# Patient Record
Sex: MALE | Race: WHITE | HISPANIC OR LATINO | Employment: FULL TIME | URBAN - METROPOLITAN AREA
[De-identification: names, ages, dates, MRNs, and addresses within clinical notes are randomized per-mention and may not be internally consistent; named-entity substitution may affect disease eponyms.]

---

## 2018-01-02 ENCOUNTER — OFFICE VISIT (OUTPATIENT)
Dept: URGENT CARE | Facility: PHYSICIAN GROUP | Age: 23
End: 2018-01-02
Payer: COMMERCIAL

## 2018-01-02 VITALS
DIASTOLIC BLOOD PRESSURE: 80 MMHG | WEIGHT: 126 LBS | OXYGEN SATURATION: 96 % | RESPIRATION RATE: 16 BRPM | HEIGHT: 69 IN | HEART RATE: 102 BPM | BODY MASS INDEX: 18.66 KG/M2 | TEMPERATURE: 100.5 F | SYSTOLIC BLOOD PRESSURE: 108 MMHG

## 2018-01-02 DIAGNOSIS — J02.9 SORE THROAT: ICD-10-CM

## 2018-01-02 DIAGNOSIS — J06.9 URI, ACUTE: Primary | ICD-10-CM

## 2018-01-02 DIAGNOSIS — R50.9 FEVER, UNSPECIFIED FEVER CAUSE: ICD-10-CM

## 2018-01-02 LAB
INT CON NEG: NORMAL
INT CON POS: NORMAL
S PYO AG THROAT QL: NEGATIVE

## 2018-01-02 PROCEDURE — 99214 OFFICE O/P EST MOD 30 MIN: CPT | Performed by: PHYSICIAN ASSISTANT

## 2018-01-02 PROCEDURE — 87880 STREP A ASSAY W/OPTIC: CPT | Performed by: PHYSICIAN ASSISTANT

## 2018-01-02 ASSESSMENT — ENCOUNTER SYMPTOMS
HEADACHES: 1
SORE THROAT: 1
MYALGIAS: 1
CHILLS: 1
FEVER: 1
SWEATS: 1
RHINORRHEA: 1
WHEEZING: 0
SPUTUM PRODUCTION: 1
COUGH: 1

## 2018-01-02 NOTE — PATIENT INSTRUCTIONS
"Influenza Facts  Flu (influenza) is a contagious respiratory illness caused by the influenza viruses. It can cause mild to severe illness. While most healthy people recover from the flu without specific treatment and without complications, older people, young children, and people with certain health conditions are at higher risk for serious complications from the flu, including death.  CAUSES   · The flu virus is spread from person to person by respiratory droplets from coughing and sneezing.   · A person can also become infected by touching an object or surface with a virus on it and then touching their mouth, eye or nose.   · Adults may be able to infect others from 1 day before symptoms occur and up to 7 days after getting sick. So it is possible to give someone the flu even before you know you are sick and continue to infect others while you are sick.   SYMPTOMS   · Fever (usually high).   · Headache.   · Tiredness (can be extreme).   · Cough.   · Sore throat.   · Runny or stuffy nose.   · Body aches.   · Diarrhea and vomiting may also occur, particularly in children.   · These symptoms are referred to as \"flu-like symptoms\". A lot of different illnesses, including the common cold, can have similar symptoms.   DIAGNOSIS   · There are tests that can determine if you have the flu as long you are tested within the first 2 or 3 days of illness.   · A doctor's exam and additional tests may be needed to identify if you have a disease that is a complicating the flu.   RISKS AND COMPLICATIONS   Some of the complications caused by the flu include:  · Bacterial pneumonia or progressive pneumonia caused by the flu virus.   · Loss of body fluids (dehydration).   · Worsening of chronic medical conditions, such as heart failure, asthma, or diabetes.   · Sinus problems and ear infections.   HOME CARE INSTRUCTIONS   · Seek medical care early on.   · If you are at high risk from complications of the flu, consult your health-care " provider as soon as you develop flu-like symptoms. Those at high risk for complications include:   · People 65 years or older.   · People with chronic medical conditions, including diabetes.   · Pregnant women.   · Young children.   · Your caregiver may recommend use of an antiviral medication to help treat the flu.   · If you get the flu, get plenty of rest, drink a lot of liquids, and avoid using alcohol and tobacco.   · You can take over-the-counter medications to relieve the symptoms of the flu if your caregiver approves. (Never give aspirin to children or teenagers who have flu-like symptoms, particularly fever).   PREVENTION   The single best way to prevent the flu is to get a flu vaccine each fall. Other measures that can help protect against the flu are:  · Antiviral Medications   · A number of antiviral drugs are approved for use in preventing the flu. These are prescription medications, and a doctor should be consulted before they are used.   · Habits for Good Health   · Cover your nose and mouth with a tissue when you cough or sneeze, throw the tissue away after you use it.   · Wash your hands often with soap and water, especially after you cough or sneeze. If you are not near water, use an alcohol-based hand .   · Avoid people who are sick.   · If you get the flu, stay home from work or school. Avoid contact with other people so that you do not make them sick, too.   · Try not to touch your eyes, nose, or mouth as germs ore often spread this way.   IN CHILDREN, EMERGENCY WARNING SIGNS THAT NEED URGENT MEDICAL ATTENTION:  · Fast breathing or trouble breathing.   · Bluish skin color.   · Not drinking enough fluids.   · Not waking up or not interacting.   · Being so irritable that the child does not want to be held.   · Flu-like symptoms improve but then return with fever and worse cough.   · Fever with a rash.   IN ADULTS, EMERGENCY WARNING SIGNS THAT NEED URGENT MEDICAL ATTENTION:  · Difficulty  breathing or shortness of breath.   · Pain or pressure in the chest or abdomen.   · Sudden dizziness.   · Confusion.   · Severe or persistent vomiting.   SEEK IMMEDIATE MEDICAL CARE IF:   You or someone you know is experiencing any of the symptoms above. When you arrive at the emergency center,report that you think you have the flu. You may be asked to wear a mask and/or sit in a secluded area to protect others from getting sick.  MAKE SURE YOU:   · Understand these instructions.   · Monitor your condition.   · Seek medical care if you are getting worse, or not improving.   Document Released: 12/20/2004 Document Revised: 03/11/2013 Document Reviewed: 09/16/2010  [employer]® Patient Information ©2013 [employer], [employer].

## 2018-01-02 NOTE — PROGRESS NOTES
Subjective:      Moses Bland is a 22 y.o. male who presents with Cough (chills, congestion, sore throat x 5 days)    PMH: Reviewed with patient/family member/EPIC.   MEDS:   Current Outpatient Prescriptions:   •  cephALEXin (KEFLEX) 500 MG Cap, Take 1 Cap by mouth 2 times a day., Disp: 10 Cap, Rfl: 0  ALLERGIES: No Known Allergies  SURGHX:   Past Surgical History:   Procedure Laterality Date   • CLOSED REDUCTION  10/9/2009    Performed by GARY CORONADO at [facility]     SOCHX:  reports that he has never smoked. He does not have any smokeless tobacco history on file. He reports that he does not drink alcohol or use drugs.  FH: Reviewed with patient/family. Not pertinent to this complaint.          Patient presents to the clinic today with complaint of fever, chills, bodyaches, sore throat ×6 days. Patient states he has been taking a number of over-the-counter cough and cold medications without improvement. Patient has positive strep and positive flu exposure at work.     Patient has missed work today and would like a work note.      Cough   This is a new problem. The current episode started in the past 7 days. The problem has been unchanged. The problem occurs every few minutes. The cough is productive of sputum. Associated symptoms include chills, a fever, headaches, myalgias, nasal congestion, postnasal drip, rhinorrhea, a sore throat and sweats. Pertinent negatives include no wheezing. The symptoms are aggravated by lying down (exertion, cold air, ). He has tried body position changes, OTC cough suppressant and rest (motrin) for the symptoms. The treatment provided mild relief. There is no history of asthma or pneumonia.       Review of Systems   Constitutional: Positive for chills, fever and malaise/fatigue.   HENT: Positive for congestion, postnasal drip, rhinorrhea and sore throat.    Respiratory: Positive for cough and sputum production. Negative for wheezing.    Musculoskeletal:  "Positive for myalgias.   Neurological: Positive for headaches.          Objective:     /80   Pulse (!) 102   Temp (!) 38.1 °C (100.5 °F)   Resp 16   Ht 1.753 m (5' 9\")   Wt 57.2 kg (126 lb)   SpO2 96%   BMI 18.61 kg/m²      Physical Exam   Constitutional: He is oriented to person, place, and time. He appears well-developed and well-nourished. No distress.   HENT:   Head: Normocephalic and atraumatic.   Right Ear: Tympanic membrane normal.   Left Ear: Tympanic membrane normal.   Nose: Mucosal edema and rhinorrhea present.   Mouth/Throat: Uvula is midline. Posterior oropharyngeal erythema present. No posterior oropharyngeal edema. No tonsillar exudate.   Eyes: Conjunctivae and EOM are normal. Pupils are equal, round, and reactive to light.   Neck: Normal range of motion. Neck supple.   Cardiovascular: Normal rate, regular rhythm and normal heart sounds.    Pulmonary/Chest: Effort normal. He has no decreased breath sounds. He has rhonchi.   Abdominal: Soft.   Musculoskeletal: Normal range of motion.   Lymphadenopathy:     He has no cervical adenopathy.   Neurological: He is alert and oriented to person, place, and time. Gait normal.   Skin: Skin is warm and dry. Capillary refill takes less than 2 seconds.   Psychiatric: He has a normal mood and affect.   Nursing note and vitals reviewed.         Strep: neg     Assessment/Plan:     1. URI, acute     2. Sore throat  POCT Rapid Strep A   3. Fever, unspecified fever cause  POCT Rapid Strep A     PT likely has the flu, however, pt presentation this clinic is out of the window for antiviral treatment of symptoms.  This was discussed with patient (family), who verbalized understanding of discussion.    PT can continue OTC medications, increase fluids and rest until symptoms improve.     PT should follow up with PCP in 1-2 days for re-evaluation if symptoms have not improved.  Discussed red flags and reasons to return to UC or ED.  Pt and/or family verbalized " understanding of diagnosis and follow up instructions and was offered informational handout on diagnosis.  PT discharged.

## 2018-01-02 NOTE — LETTER
January 2, 2018         Patient: Moses Bland   YOB: 1995   Date of Visit: 1/2/2018           To Whom it May Concern:    Moses Bland was seen in my clinic on 1/2/2018. He may return to work on 01/05/2017.    If you have any questions or concerns, please don't hesitate to call.        Sincerely,           Miri Ge P.A.-C.  Electronically Signed

## 2023-06-19 ENCOUNTER — OFFICE VISIT (OUTPATIENT)
Dept: MEDICAL GROUP | Facility: MEDICAL CENTER | Age: 28
End: 2023-06-19
Payer: COMMERCIAL

## 2023-06-19 VITALS
HEIGHT: 70 IN | DIASTOLIC BLOOD PRESSURE: 70 MMHG | WEIGHT: 125 LBS | OXYGEN SATURATION: 97 % | BODY MASS INDEX: 17.9 KG/M2 | TEMPERATURE: 98.5 F | HEART RATE: 77 BPM | SYSTOLIC BLOOD PRESSURE: 124 MMHG

## 2023-06-19 DIAGNOSIS — Z13.6 SCREENING FOR CARDIOVASCULAR CONDITION: ICD-10-CM

## 2023-06-19 DIAGNOSIS — Z11.3 SCREENING EXAMINATION FOR SEXUALLY TRANSMITTED DISEASE: ICD-10-CM

## 2023-06-19 DIAGNOSIS — R10.11 RUQ PAIN: ICD-10-CM

## 2023-06-19 DIAGNOSIS — L20.82 FLEXURAL ECZEMA: ICD-10-CM

## 2023-06-19 DIAGNOSIS — Z23 NEED FOR VACCINATION: ICD-10-CM

## 2023-06-19 DIAGNOSIS — Z11.59 NEED FOR HEPATITIS C SCREENING TEST: ICD-10-CM

## 2023-06-19 PROCEDURE — 90715 TDAP VACCINE 7 YRS/> IM: CPT | Performed by: FAMILY MEDICINE

## 2023-06-19 PROCEDURE — 90471 IMMUNIZATION ADMIN: CPT | Performed by: FAMILY MEDICINE

## 2023-06-19 PROCEDURE — 3078F DIAST BP <80 MM HG: CPT | Performed by: FAMILY MEDICINE

## 2023-06-19 PROCEDURE — 99204 OFFICE O/P NEW MOD 45 MIN: CPT | Mod: 25 | Performed by: FAMILY MEDICINE

## 2023-06-19 PROCEDURE — 3074F SYST BP LT 130 MM HG: CPT | Performed by: FAMILY MEDICINE

## 2023-06-19 RX ORDER — IBUPROFEN 600 MG/1
TABLET ORAL
COMMUNITY
Start: 2023-03-27

## 2023-06-19 RX ORDER — TRIAMCINOLONE ACETONIDE 1 MG/G
1 CREAM TOPICAL 2 TIMES DAILY PRN
Qty: 45 G | Refills: 0 | Status: SHIPPED | OUTPATIENT
Start: 2023-06-19

## 2023-06-19 SDOH — ECONOMIC STABILITY: TRANSPORTATION INSECURITY
IN THE PAST 12 MONTHS, HAS LACK OF RELIABLE TRANSPORTATION KEPT YOU FROM MEDICAL APPOINTMENTS, MEETINGS, WORK OR FROM GETTING THINGS NEEDED FOR DAILY LIVING?: NO

## 2023-06-19 SDOH — ECONOMIC STABILITY: HOUSING INSECURITY
IN THE LAST 12 MONTHS, WAS THERE A TIME WHEN YOU DID NOT HAVE A STEADY PLACE TO SLEEP OR SLEPT IN A SHELTER (INCLUDING NOW)?: NO

## 2023-06-19 SDOH — ECONOMIC STABILITY: INCOME INSECURITY: IN THE LAST 12 MONTHS, WAS THERE A TIME WHEN YOU WERE NOT ABLE TO PAY THE MORTGAGE OR RENT ON TIME?: YES

## 2023-06-19 SDOH — ECONOMIC STABILITY: HOUSING INSECURITY
IN THE LAST 12 MONTHS, WAS THERE A TIME WHEN YOU DID NOT HAVE A STEADY PLACE TO SLEEP OR SLEPT IN A SHELTER (INCLUDING NOW)?: YES

## 2023-06-19 SDOH — ECONOMIC STABILITY: HOUSING INSECURITY: IN THE LAST 12 MONTHS, HOW MANY PLACES HAVE YOU LIVED?: 1

## 2023-06-19 SDOH — ECONOMIC STABILITY: FOOD INSECURITY: WITHIN THE PAST 12 MONTHS, YOU WORRIED THAT YOUR FOOD WOULD RUN OUT BEFORE YOU GOT MONEY TO BUY MORE.: NEVER TRUE

## 2023-06-19 SDOH — ECONOMIC STABILITY: INCOME INSECURITY: HOW HARD IS IT FOR YOU TO PAY FOR THE VERY BASICS LIKE FOOD, HOUSING, MEDICAL CARE, AND HEATING?: HARD

## 2023-06-19 SDOH — ECONOMIC STABILITY: TRANSPORTATION INSECURITY
IN THE PAST 12 MONTHS, HAS THE LACK OF TRANSPORTATION KEPT YOU FROM MEDICAL APPOINTMENTS OR FROM GETTING MEDICATIONS?: NO

## 2023-06-19 SDOH — HEALTH STABILITY: PHYSICAL HEALTH: ON AVERAGE, HOW MANY DAYS PER WEEK DO YOU ENGAGE IN MODERATE TO STRENUOUS EXERCISE (LIKE A BRISK WALK)?: 4 DAYS

## 2023-06-19 SDOH — ECONOMIC STABILITY: FOOD INSECURITY: WITHIN THE PAST 12 MONTHS, THE FOOD YOU BOUGHT JUST DIDN'T LAST AND YOU DIDN'T HAVE MONEY TO GET MORE.: NEVER TRUE

## 2023-06-19 SDOH — HEALTH STABILITY: MENTAL HEALTH
STRESS IS WHEN SOMEONE FEELS TENSE, NERVOUS, ANXIOUS, OR CAN'T SLEEP AT NIGHT BECAUSE THEIR MIND IS TROUBLED. HOW STRESSED ARE YOU?: TO SOME EXTENT

## 2023-06-19 SDOH — HEALTH STABILITY: PHYSICAL HEALTH: ON AVERAGE, HOW MANY MINUTES DO YOU ENGAGE IN EXERCISE AT THIS LEVEL?: 150+ MIN

## 2023-06-19 SDOH — ECONOMIC STABILITY: TRANSPORTATION INSECURITY
IN THE PAST 12 MONTHS, HAS LACK OF TRANSPORTATION KEPT YOU FROM MEETINGS, WORK, OR FROM GETTING THINGS NEEDED FOR DAILY LIVING?: NO

## 2023-06-19 ASSESSMENT — SOCIAL DETERMINANTS OF HEALTH (SDOH)
IN A TYPICAL WEEK, HOW MANY TIMES DO YOU TALK ON THE PHONE WITH FAMILY, FRIENDS, OR NEIGHBORS?: MORE THAN THREE TIMES A WEEK
HOW OFTEN DO YOU GET TOGETHER WITH FRIENDS OR RELATIVES?: MORE THAN THREE TIMES A WEEK
IN A TYPICAL WEEK, HOW MANY TIMES DO YOU TALK ON THE PHONE WITH FAMILY, FRIENDS, OR NEIGHBORS?: MORE THAN THREE TIMES A WEEK
ARE YOU MARRIED, WIDOWED, DIVORCED, SEPARATED, NEVER MARRIED, OR LIVING WITH A PARTNER?: NEVER MARRIED
HOW OFTEN DO YOU GET TOGETHER WITH FRIENDS OR RELATIVES?: MORE THAN THREE TIMES A WEEK
HOW OFTEN DO YOU HAVE SIX OR MORE DRINKS ON ONE OCCASION: NEVER
HOW HARD IS IT FOR YOU TO PAY FOR THE VERY BASICS LIKE FOOD, HOUSING, MEDICAL CARE, AND HEATING?: HARD
HOW OFTEN DO YOU ATTENT MEETINGS OF THE CLUB OR ORGANIZATION YOU BELONG TO?: NEVER
HOW OFTEN DO YOU ATTEND CHURCH OR RELIGIOUS SERVICES?: NEVER
WITHIN THE PAST 12 MONTHS, YOU WORRIED THAT YOUR FOOD WOULD RUN OUT BEFORE YOU GOT THE MONEY TO BUY MORE: NEVER TRUE
HOW OFTEN DO YOU ATTEND CHURCH OR RELIGIOUS SERVICES?: NEVER
HOW MANY DRINKS CONTAINING ALCOHOL DO YOU HAVE ON A TYPICAL DAY WHEN YOU ARE DRINKING: 1 OR 2
DO YOU BELONG TO ANY CLUBS OR ORGANIZATIONS SUCH AS CHURCH GROUPS UNIONS, FRATERNAL OR ATHLETIC GROUPS, OR SCHOOL GROUPS?: NO
DO YOU BELONG TO ANY CLUBS OR ORGANIZATIONS SUCH AS CHURCH GROUPS UNIONS, FRATERNAL OR ATHLETIC GROUPS, OR SCHOOL GROUPS?: NO
HOW OFTEN DO YOU ATTENT MEETINGS OF THE CLUB OR ORGANIZATION YOU BELONG TO?: NEVER
HOW OFTEN DO YOU HAVE A DRINK CONTAINING ALCOHOL: MONTHLY OR LESS
ARE YOU MARRIED, WIDOWED, DIVORCED, SEPARATED, NEVER MARRIED, OR LIVING WITH A PARTNER?: NEVER MARRIED

## 2023-06-19 ASSESSMENT — LIFESTYLE VARIABLES
SKIP TO QUESTIONS 9-10: 1
HOW OFTEN DO YOU HAVE A DRINK CONTAINING ALCOHOL: MONTHLY OR LESS
HOW OFTEN DO YOU HAVE SIX OR MORE DRINKS ON ONE OCCASION: NEVER
AUDIT-C TOTAL SCORE: 1
HOW MANY STANDARD DRINKS CONTAINING ALCOHOL DO YOU HAVE ON A TYPICAL DAY: 1 OR 2

## 2023-06-19 ASSESSMENT — PATIENT HEALTH QUESTIONNAIRE - PHQ9: CLINICAL INTERPRETATION OF PHQ2 SCORE: 0

## 2023-06-19 NOTE — PROGRESS NOTES
"Subjective:     CC:  Diagnoses of RUQ pain, Flexural eczema, Screening examination for sexually transmitted disease, Need for hepatitis C screening test, Need for vaccination, and Screening for cardiovascular condition were pertinent to this visit.    HISTORY OF THE PRESENT ILLNESS: Patient is a 28 y.o. male. This pleasant patient is here today to establish care and discuss RUQ Pain.     RUQ pain  Under lower right rib and radiates to back  Pain has been intermittent, thought it was dietary, started a year ago  Had improved  However over past week has been having more frequent pain  Back hurts  Getting stabbing, burning pain  Seems worse in the morning after breakfast  Last year has had more pellet like stools  Having more belching    Eczema  Has had eczema for a while  Typically gets it in flexural area of arms but has spread  His mother also gets eczema  He has tried hydrocortisone cream, can help    Acne  On his back  Has been ongoing    Problem   Ruq Pain   Flexural Eczema       Current Outpatient Medications Ordered in Epic   Medication Sig Dispense Refill    ibuprofen (MOTRIN) 600 MG Tab TAKE 1 TABLET BY MOUTH EVERY 6 HOURS AS NEEDED. PHARMACY NOT COVERED      triamcinolone acetonide (KENALOG) 0.1 % Cream Apply 1 Application topically 2 times a day as needed (eczema rash). 45 g 0     No current Epic-ordered facility-administered medications on file.       Health Maintenance: Tdap, STD testing, HCV testing    ROS:   ROS see HPI      Objective:       Exam: /70   Pulse 77   Temp 36.9 °C (98.5 °F) (Temporal)   Ht 1.778 m (5' 10\")   Wt 56.7 kg (125 lb)   SpO2 97%  Body mass index is 17.94 kg/m².    Physical Exam  Vitals reviewed.   Constitutional:       General: He is not in acute distress.     Appearance: Normal appearance.   HENT:      Head: Normocephalic and atraumatic.   Cardiovascular:      Rate and Rhythm: Normal rate and regular rhythm.      Heart sounds: Normal heart sounds.   Pulmonary:      " Effort: Pulmonary effort is normal. No respiratory distress.      Breath sounds: Normal breath sounds.   Abdominal:      General: Abdomen is flat. Bowel sounds are normal. There is no distension.      Palpations: Abdomen is soft. There is no mass.      Tenderness: There is no abdominal tenderness. There is guarding (voluntary).      Hernia: No hernia is present.   Musculoskeletal:         General: No tenderness.   Skin:     General: Skin is warm and dry.      Findings: Rash (slight rash on right elbow) present.   Neurological:      Mental Status: He is alert. Mental status is at baseline.   Psychiatric:         Mood and Affect: Mood normal.         Behavior: Behavior normal.           Assessment & Plan:   28 y.o. male with the following -    1. RUQ pain  Suspect gallbladder pathology  Discussed ER precautions  Will begin work up with labs and imaging  Provided info in AVS regarding gallbladder eating plan  - CBC WITH DIFFERENTIAL; Future  - Comp Metabolic Panel; Future  - TSH WITH REFLEX TO FT4; Future  - CRP QUANTITIVE (NON-CARDIAC); Future  - HELICOBACTER PYLORI BREATH TEST; Future  - LIPASE; Future  - US-RUQ; Future    2. Flexural eczema  Has had eczema previously, runs in family from mother  Will try Kenalog  - CBC WITH DIFFERENTIAL; Future  - triamcinolone acetonide (KENALOG) 0.1 % Cream; Apply 1 Application topically 2 times a day as needed (eczema rash).  Dispense: 45 g; Refill: 0    3. Screening examination for sexually transmitted disease  - Chlamydia/GC, PCR (Urine); Future  - T.PALLIDUM AB SUSAN (SCREENING); Future  - HIV AG/AB COMBO ASSAY SCREENING; Future  - HEP C VIRUS ANTIBODY; Future    4. Need for hepatitis C screening test  - HEP C VIRUS ANTIBODY; Future    5. Need for vaccination  - Tdap =>6yo IM    6. Screening for cardiovascular condition  - Lipid Profile; Future    Other orders  - ibuprofen (MOTRIN) 600 MG Tab; TAKE 1 TABLET BY MOUTH EVERY 6 HOURS AS NEEDED. PHARMACY NOT COVERED        Return in  about 4 weeks (around 7/17/2023), or if symptoms worsen or fail to improve.    Please note that this dictation was created using voice recognition software. I have made every reasonable attempt to correct obvious errors, but I expect that there are errors of grammar and possibly content that I did not discover before finalizing the note.

## 2023-06-20 ENCOUNTER — HOSPITAL ENCOUNTER (OUTPATIENT)
Dept: LAB | Facility: MEDICAL CENTER | Age: 28
End: 2023-06-20
Attending: FAMILY MEDICINE
Payer: COMMERCIAL

## 2023-06-20 DIAGNOSIS — Z11.3 SCREENING EXAMINATION FOR SEXUALLY TRANSMITTED DISEASE: ICD-10-CM

## 2023-06-20 DIAGNOSIS — R10.11 RUQ PAIN: ICD-10-CM

## 2023-06-20 DIAGNOSIS — Z11.59 NEED FOR HEPATITIS C SCREENING TEST: ICD-10-CM

## 2023-06-20 DIAGNOSIS — Z13.6 SCREENING FOR CARDIOVASCULAR CONDITION: ICD-10-CM

## 2023-06-20 DIAGNOSIS — L20.82 FLEXURAL ECZEMA: ICD-10-CM

## 2023-06-20 LAB
ALBUMIN SERPL BCP-MCNC: 5.4 G/DL (ref 3.2–4.9)
ALBUMIN/GLOB SERPL: 2.5 G/DL
ALP SERPL-CCNC: 66 U/L (ref 30–99)
ALT SERPL-CCNC: 16 U/L (ref 2–50)
ANION GAP SERPL CALC-SCNC: 13 MMOL/L (ref 7–16)
AST SERPL-CCNC: 20 U/L (ref 12–45)
BASOPHILS # BLD AUTO: 0.6 % (ref 0–1.8)
BASOPHILS # BLD: 0.04 K/UL (ref 0–0.12)
BILIRUB SERPL-MCNC: 1.1 MG/DL (ref 0.1–1.5)
BUN SERPL-MCNC: 15 MG/DL (ref 8–22)
CALCIUM ALBUM COR SERPL-MCNC: 9 MG/DL (ref 8.5–10.5)
CALCIUM SERPL-MCNC: 10.1 MG/DL (ref 8.5–10.5)
CHLORIDE SERPL-SCNC: 105 MMOL/L (ref 96–112)
CHOLEST SERPL-MCNC: 134 MG/DL (ref 100–199)
CO2 SERPL-SCNC: 21 MMOL/L (ref 20–33)
CREAT SERPL-MCNC: 0.87 MG/DL (ref 0.5–1.4)
CRP SERPL HS-MCNC: <0.3 MG/DL (ref 0–0.75)
EOSINOPHIL # BLD AUTO: 0.23 K/UL (ref 0–0.51)
EOSINOPHIL NFR BLD: 3.3 % (ref 0–6.9)
ERYTHROCYTE [DISTWIDTH] IN BLOOD BY AUTOMATED COUNT: 37.7 FL (ref 35.9–50)
FASTING STATUS PATIENT QL REPORTED: NORMAL
GFR SERPLBLD CREATININE-BSD FMLA CKD-EPI: 120 ML/MIN/1.73 M 2
GLOBULIN SER CALC-MCNC: 2.2 G/DL (ref 1.9–3.5)
GLUCOSE SERPL-MCNC: 74 MG/DL (ref 65–99)
HCT VFR BLD AUTO: 50.4 % (ref 42–52)
HCV AB SER QL: NORMAL
HDLC SERPL-MCNC: 64 MG/DL
HGB BLD-MCNC: 16.7 G/DL (ref 14–18)
HIV 1+2 AB+HIV1 P24 AG SERPL QL IA: NORMAL
IMM GRANULOCYTES # BLD AUTO: 0.01 K/UL (ref 0–0.11)
IMM GRANULOCYTES NFR BLD AUTO: 0.1 % (ref 0–0.9)
LDLC SERPL CALC-MCNC: 62 MG/DL
LIPASE SERPL-CCNC: 30 U/L (ref 11–82)
LYMPHOCYTES # BLD AUTO: 1.77 K/UL (ref 1–4.8)
LYMPHOCYTES NFR BLD: 25.4 % (ref 22–41)
MCH RBC QN AUTO: 28.2 PG (ref 27–33)
MCHC RBC AUTO-ENTMCNC: 33.1 G/DL (ref 32.3–36.5)
MCV RBC AUTO: 85 FL (ref 81.4–97.8)
MONOCYTES # BLD AUTO: 0.45 K/UL (ref 0–0.85)
MONOCYTES NFR BLD AUTO: 6.5 % (ref 0–13.4)
NEUTROPHILS # BLD AUTO: 4.46 K/UL (ref 1.82–7.42)
NEUTROPHILS NFR BLD: 64.1 % (ref 44–72)
NRBC # BLD AUTO: 0 K/UL
NRBC BLD-RTO: 0 /100 WBC (ref 0–0.2)
PLATELET # BLD AUTO: 217 K/UL (ref 164–446)
PMV BLD AUTO: 11.7 FL (ref 9–12.9)
POTASSIUM SERPL-SCNC: 4 MMOL/L (ref 3.6–5.5)
PROT SERPL-MCNC: 7.6 G/DL (ref 6–8.2)
RBC # BLD AUTO: 5.93 M/UL (ref 4.7–6.1)
SODIUM SERPL-SCNC: 139 MMOL/L (ref 135–145)
T PALLIDUM AB SER QL IA: NORMAL
TRIGL SERPL-MCNC: 41 MG/DL (ref 0–149)
TSH SERPL DL<=0.005 MIU/L-ACNC: 1.1 UIU/ML (ref 0.38–5.33)
WBC # BLD AUTO: 7 K/UL (ref 4.8–10.8)

## 2023-06-20 PROCEDURE — 80061 LIPID PANEL: CPT

## 2023-06-20 PROCEDURE — 86780 TREPONEMA PALLIDUM: CPT

## 2023-06-20 PROCEDURE — 83013 H PYLORI (C-13) BREATH: CPT

## 2023-06-20 PROCEDURE — 87389 HIV-1 AG W/HIV-1&-2 AB AG IA: CPT

## 2023-06-20 PROCEDURE — 84443 ASSAY THYROID STIM HORMONE: CPT

## 2023-06-20 PROCEDURE — 83690 ASSAY OF LIPASE: CPT

## 2023-06-20 PROCEDURE — 86140 C-REACTIVE PROTEIN: CPT

## 2023-06-20 PROCEDURE — 80053 COMPREHEN METABOLIC PANEL: CPT

## 2023-06-20 PROCEDURE — 36415 COLL VENOUS BLD VENIPUNCTURE: CPT

## 2023-06-20 PROCEDURE — 86803 HEPATITIS C AB TEST: CPT

## 2023-06-20 PROCEDURE — 87491 CHLMYD TRACH DNA AMP PROBE: CPT

## 2023-06-20 PROCEDURE — 87591 N.GONORRHOEAE DNA AMP PROB: CPT

## 2023-06-20 PROCEDURE — 85025 COMPLETE CBC W/AUTO DIFF WBC: CPT

## 2023-06-21 LAB
C TRACH DNA SPEC QL NAA+PROBE: NEGATIVE
N GONORRHOEA DNA SPEC QL NAA+PROBE: NEGATIVE
SPECIMEN SOURCE: NORMAL
UREA BREATH TEST QL: NEGATIVE

## 2023-07-03 ENCOUNTER — HOSPITAL ENCOUNTER (OUTPATIENT)
Dept: RADIOLOGY | Facility: MEDICAL CENTER | Age: 28
End: 2023-07-03
Attending: FAMILY MEDICINE
Payer: COMMERCIAL

## 2023-07-03 DIAGNOSIS — R10.11 RUQ PAIN: ICD-10-CM

## 2023-07-03 PROCEDURE — 76705 ECHO EXAM OF ABDOMEN: CPT

## 2023-07-18 ENCOUNTER — TELEPHONE (OUTPATIENT)
Dept: HEALTH INFORMATION MANAGEMENT | Facility: OTHER | Age: 28
End: 2023-07-18